# Patient Record
Sex: FEMALE | Race: WHITE
[De-identification: names, ages, dates, MRNs, and addresses within clinical notes are randomized per-mention and may not be internally consistent; named-entity substitution may affect disease eponyms.]

---
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
## 2023-02-23 ENCOUNTER — RX RENEWAL (OUTPATIENT)
Age: 18
End: 2023-02-23

## 2023-03-24 ENCOUNTER — APPOINTMENT (OUTPATIENT)
Dept: PEDIATRIC CARDIOLOGY | Facility: CLINIC | Age: 18
End: 2023-03-24

## 2023-05-19 ENCOUNTER — RX RENEWAL (OUTPATIENT)
Age: 18
End: 2023-05-19

## 2023-06-14 ENCOUNTER — RX RENEWAL (OUTPATIENT)
Age: 18
End: 2023-06-14

## 2023-10-12 ENCOUNTER — APPOINTMENT (OUTPATIENT)
Dept: CARDIOLOGY | Facility: CLINIC | Age: 18
End: 2023-10-12
Payer: COMMERCIAL

## 2023-10-12 ENCOUNTER — NON-APPOINTMENT (OUTPATIENT)
Age: 18
End: 2023-10-12

## 2023-10-12 VITALS
DIASTOLIC BLOOD PRESSURE: 64 MMHG | SYSTOLIC BLOOD PRESSURE: 118 MMHG | OXYGEN SATURATION: 99 % | HEIGHT: 60.24 IN | BODY MASS INDEX: 39.14 KG/M2 | WEIGHT: 202 LBS | HEART RATE: 64 BPM

## 2023-10-12 DIAGNOSIS — R94.31 ABNORMAL ELECTROCARDIOGRAM [ECG] [EKG]: ICD-10-CM

## 2023-10-12 DIAGNOSIS — Q96.3 MOSAICISM, 45, X/46, XX OR XY: ICD-10-CM

## 2023-10-12 PROCEDURE — 93000 ELECTROCARDIOGRAM COMPLETE: CPT

## 2023-10-12 PROCEDURE — 99204 OFFICE O/P NEW MOD 45 MIN: CPT

## 2023-11-06 ENCOUNTER — RX CHANGE (OUTPATIENT)
Age: 18
End: 2023-11-06

## 2023-11-16 ENCOUNTER — APPOINTMENT (OUTPATIENT)
Dept: CARDIOLOGY | Facility: CLINIC | Age: 18
End: 2023-11-16

## 2023-12-04 ENCOUNTER — RX CHANGE (OUTPATIENT)
Age: 18
End: 2023-12-04

## 2023-12-21 ENCOUNTER — APPOINTMENT (OUTPATIENT)
Dept: CARDIOLOGY | Facility: CLINIC | Age: 18
End: 2023-12-21

## 2024-01-08 ENCOUNTER — RX CHANGE (OUTPATIENT)
Age: 19
End: 2024-01-08

## 2024-01-18 ENCOUNTER — APPOINTMENT (OUTPATIENT)
Dept: CARDIOLOGY | Facility: CLINIC | Age: 19
End: 2024-01-18

## 2024-02-06 ENCOUNTER — RX CHANGE (OUTPATIENT)
Age: 19
End: 2024-02-06

## 2024-02-12 ENCOUNTER — RX RENEWAL (OUTPATIENT)
Age: 19
End: 2024-02-12

## 2024-02-13 ENCOUNTER — RX CHANGE (OUTPATIENT)
Age: 19
End: 2024-02-13

## 2024-02-27 ENCOUNTER — APPOINTMENT (OUTPATIENT)
Dept: CARDIOLOGY | Facility: CLINIC | Age: 19
End: 2024-02-27
Payer: COMMERCIAL

## 2024-02-27 PROCEDURE — 93306 TTE W/DOPPLER COMPLETE: CPT

## 2024-02-27 NOTE — CARDIOLOGY SUMMARY
[de-identified] : Normal sinus rhythm, calculated QTc 397 ms [de-identified] : Active problems -Mosaic Beltran syndrome -Reported long QT syndrome -ECHOCARDIOGRAM: 2/27/2024, TDS, EF 55-60, probable tricuspid aortic valve, no definite PFO, normal thoracic aorta, trace mitral regurgitation, no PA systolic -EVENT MONITOR: 12/12/2023, NSR 94 (), 3 symptoms =SR

## 2024-02-27 NOTE — CARDIOLOGY SUMMARY
[de-identified] : Normal sinus rhythm, calculated QTc 397 ms [de-identified] : Active problems -Mosaic Beltran syndrome -Reported long QT syndrome -ECHOCARDIOGRAM: 2/27/2024, TDS, EF 55-60, probable tricuspid aortic valve, no definite PFO, normal thoracic aorta, trace mitral regurgitation, no PA systolic -EVENT MONITOR: 12/12/2023, NSR 94 (), 3 symptoms =SR

## 2024-02-27 NOTE — DISCUSSION/SUMMARY
[FreeTextEntry1] : 1 mosaic Beltran syndrome An echocardiogram was ordered to rule out a cardiomyopathy or valvular abnormality in view of her history of mosaic Beltran syndrome and reported long QT syndrome 2 reported long QT syndrome QTc calculated today as 397 ms which is within normal limits Medical records requested from the patient's prior cardiologist 24-hour Holter monitor to rule out conduction system disease in view of the patient's history of mosaic Beltran syndrome  3 obesity BMI 39, we will discuss diet, exercise, and weight loss after review of the patient's medical records

## 2024-02-27 NOTE — HISTORY OF PRESENT ILLNESS
[FreeTextEntry1] : The patient is a 18-year-old female with a past medical history remarkable for mosaic Beltran syndrome and reported long QT interval who is making a change in her cardiology care. The patient does not exercise.  She is chest pain and dyspnea free.  She denies palpitations, dizziness, or loss of consciousness.  She reports no family history of sudden cardiac death

## 2024-10-01 ENCOUNTER — RX RENEWAL (OUTPATIENT)
Age: 19
End: 2024-10-01

## 2024-10-30 ENCOUNTER — APPOINTMENT (OUTPATIENT)
Dept: CARDIOLOGY | Facility: CLINIC | Age: 19
End: 2024-10-30
Payer: COMMERCIAL

## 2024-10-30 VITALS
BODY MASS INDEX: 42.1 KG/M2 | HEIGHT: 61 IN | DIASTOLIC BLOOD PRESSURE: 78 MMHG | HEART RATE: 93 BPM | WEIGHT: 223 LBS | SYSTOLIC BLOOD PRESSURE: 114 MMHG | OXYGEN SATURATION: 96 %

## 2024-10-30 DIAGNOSIS — R00.2 PALPITATIONS: ICD-10-CM

## 2024-10-30 DIAGNOSIS — Q96.3 MOSAICISM, 45, X/46, XX OR XY: ICD-10-CM

## 2024-10-30 DIAGNOSIS — E66.811 OBESITY, CLASS 1: ICD-10-CM

## 2024-10-30 PROCEDURE — 93000 ELECTROCARDIOGRAM COMPLETE: CPT

## 2024-10-30 PROCEDURE — 99214 OFFICE O/P EST MOD 30 MIN: CPT

## 2024-11-07 ENCOUNTER — RX RENEWAL (OUTPATIENT)
Age: 19
End: 2024-11-07

## 2025-06-09 ENCOUNTER — NON-APPOINTMENT (OUTPATIENT)
Age: 20
End: 2025-06-09

## 2025-06-11 ENCOUNTER — APPOINTMENT (OUTPATIENT)
Dept: CARDIOLOGY | Facility: CLINIC | Age: 20
End: 2025-06-11
Payer: COMMERCIAL

## 2025-06-11 VITALS
HEART RATE: 101 BPM | BODY MASS INDEX: 40.97 KG/M2 | OXYGEN SATURATION: 97 % | HEIGHT: 61 IN | SYSTOLIC BLOOD PRESSURE: 107 MMHG | WEIGHT: 217 LBS | DIASTOLIC BLOOD PRESSURE: 73 MMHG

## 2025-06-11 PROBLEM — E66.01 MORBID OBESITY WITH BMI OF 40.0-44.9, ADULT: Status: ACTIVE | Noted: 2025-06-11

## 2025-06-11 PROCEDURE — 93000 ELECTROCARDIOGRAM COMPLETE: CPT

## 2025-06-11 PROCEDURE — G2211 COMPLEX E/M VISIT ADD ON: CPT | Mod: NC

## 2025-06-11 PROCEDURE — 99215 OFFICE O/P EST HI 40 MIN: CPT

## 2025-07-01 ENCOUNTER — APPOINTMENT (OUTPATIENT)
Dept: CARDIOLOGY | Facility: CLINIC | Age: 20
End: 2025-07-01
Payer: COMMERCIAL

## 2025-07-01 PROCEDURE — 93306 TTE W/DOPPLER COMPLETE: CPT

## 2025-07-17 ENCOUNTER — APPOINTMENT (OUTPATIENT)
Dept: SURGICAL ONCOLOGY | Facility: CLINIC | Age: 20
End: 2025-07-17
Payer: COMMERCIAL

## 2025-07-17 VITALS
HEART RATE: 86 BPM | BODY MASS INDEX: 40.29 KG/M2 | OXYGEN SATURATION: 98 % | WEIGHT: 213.4 LBS | DIASTOLIC BLOOD PRESSURE: 63 MMHG | SYSTOLIC BLOOD PRESSURE: 96 MMHG | TEMPERATURE: 96.3 F | HEIGHT: 61 IN

## 2025-07-17 PROBLEM — Z76.89 ENCOUNTER TO ESTABLISH CARE: Status: ACTIVE | Noted: 2025-07-17

## 2025-07-17 PROBLEM — D48.5 ATYPICAL INTRAEPIDERMAL MELANOCYTIC PROLIFERATION: Status: ACTIVE | Noted: 2025-07-17

## 2025-07-17 PROCEDURE — 99205 OFFICE O/P NEW HI 60 MIN: CPT

## 2025-07-28 ENCOUNTER — APPOINTMENT (OUTPATIENT)
Dept: SURGICAL ONCOLOGY | Facility: CLINIC | Age: 20
End: 2025-07-28

## 2025-07-30 ENCOUNTER — OUTPATIENT (OUTPATIENT)
Dept: OUTPATIENT SERVICES | Facility: HOSPITAL | Age: 20
LOS: 1 days | End: 2025-07-30
Payer: MEDICARE

## 2025-07-30 ENCOUNTER — APPOINTMENT (OUTPATIENT)
Dept: SURGICAL ONCOLOGY | Facility: CLINIC | Age: 20
End: 2025-07-30

## 2025-07-30 DIAGNOSIS — Z03.89 ENCOUNTER FOR OBSERVATION FOR OTHER SUSPECTED DISEASES AND CONDITIONS RULED OUT: ICD-10-CM

## 2025-07-30 PROCEDURE — 88341 IMHCHEM/IMCYTCHM EA ADD ANTB: CPT | Mod: 26

## 2025-07-30 PROCEDURE — 88341 IMHCHEM/IMCYTCHM EA ADD ANTB: CPT

## 2025-07-30 PROCEDURE — 88323 CONSLTJ&REPRT MATRL PREP SLD: CPT

## 2025-07-30 PROCEDURE — 88323 CONSLTJ&REPRT MATRL PREP SLD: CPT | Mod: 26

## 2025-07-30 PROCEDURE — 88342 IMHCHEM/IMCYTCHM 1ST ANTB: CPT

## 2025-07-30 PROCEDURE — 88342 IMHCHEM/IMCYTCHM 1ST ANTB: CPT | Mod: 26,59

## 2025-08-21 LAB — SURGICAL PATHOLOGY STUDY: SIGNIFICANT CHANGE UP

## 2025-09-02 ENCOUNTER — APPOINTMENT (OUTPATIENT)
Dept: SURGICAL ONCOLOGY | Facility: CLINIC | Age: 20
End: 2025-09-02